# Patient Record
Sex: MALE | Race: WHITE | Employment: FULL TIME | URBAN - NONMETROPOLITAN AREA
[De-identification: names, ages, dates, MRNs, and addresses within clinical notes are randomized per-mention and may not be internally consistent; named-entity substitution may affect disease eponyms.]

---

## 2017-04-25 ENCOUNTER — OFFICE VISIT (OUTPATIENT)
Dept: INTERNAL MEDICINE | Age: 62
End: 2017-04-25

## 2017-04-25 VITALS
HEIGHT: 69 IN | WEIGHT: 172 LBS | DIASTOLIC BLOOD PRESSURE: 80 MMHG | SYSTOLIC BLOOD PRESSURE: 114 MMHG | BODY MASS INDEX: 25.48 KG/M2

## 2017-04-25 DIAGNOSIS — M79.10 MUSCLE PAIN: ICD-10-CM

## 2017-04-25 DIAGNOSIS — Z95.1 S/P CABG (CORONARY ARTERY BYPASS GRAFT): Primary | ICD-10-CM

## 2017-04-25 DIAGNOSIS — E55.9 VITAMIN D DEFICIENCY: ICD-10-CM

## 2017-04-25 PROCEDURE — G8598 ASA/ANTIPLAT THER USED: HCPCS | Performed by: INTERNAL MEDICINE

## 2017-04-25 PROCEDURE — G8419 CALC BMI OUT NRM PARAM NOF/U: HCPCS | Performed by: INTERNAL MEDICINE

## 2017-04-25 PROCEDURE — 1036F TOBACCO NON-USER: CPT | Performed by: INTERNAL MEDICINE

## 2017-04-25 PROCEDURE — 99213 OFFICE O/P EST LOW 20 MIN: CPT | Performed by: INTERNAL MEDICINE

## 2017-04-25 PROCEDURE — G8427 DOCREV CUR MEDS BY ELIG CLIN: HCPCS | Performed by: INTERNAL MEDICINE

## 2017-04-25 PROCEDURE — 3017F COLORECTAL CA SCREEN DOC REV: CPT | Performed by: INTERNAL MEDICINE

## 2017-04-25 PROCEDURE — 93000 ELECTROCARDIOGRAM COMPLETE: CPT | Performed by: INTERNAL MEDICINE

## 2017-04-25 RX ORDER — ATENOLOL 25 MG/1
12.5 TABLET ORAL DAILY
Qty: 30 TABLET | Refills: 11 | Status: SHIPPED | OUTPATIENT
Start: 2017-04-25 | End: 2018-04-23 | Stop reason: SDUPTHER

## 2017-04-25 RX ORDER — SIMVASTATIN 20 MG
20 TABLET ORAL NIGHTLY
Qty: 30 TABLET | Refills: 11 | Status: SHIPPED | OUTPATIENT
Start: 2017-04-25 | End: 2018-04-23 | Stop reason: SDUPTHER

## 2017-04-25 ASSESSMENT — PATIENT HEALTH QUESTIONNAIRE - PHQ9
2. FEELING DOWN, DEPRESSED OR HOPELESS: 0
SUM OF ALL RESPONSES TO PHQ9 QUESTIONS 1 & 2: 0
SUM OF ALL RESPONSES TO PHQ QUESTIONS 1-9: 0
1. LITTLE INTEREST OR PLEASURE IN DOING THINGS: 0

## 2017-06-28 ENCOUNTER — TELEPHONE (OUTPATIENT)
Dept: INTERNAL MEDICINE | Age: 62
End: 2017-06-28

## 2018-04-23 ENCOUNTER — OFFICE VISIT (OUTPATIENT)
Dept: INTERNAL MEDICINE CLINIC | Age: 63
End: 2018-04-23
Payer: COMMERCIAL

## 2018-04-23 VITALS
WEIGHT: 176 LBS | BODY MASS INDEX: 26.07 KG/M2 | HEIGHT: 69 IN | DIASTOLIC BLOOD PRESSURE: 70 MMHG | SYSTOLIC BLOOD PRESSURE: 126 MMHG

## 2018-04-23 DIAGNOSIS — E78.00 PURE HYPERCHOLESTEROLEMIA: ICD-10-CM

## 2018-04-23 DIAGNOSIS — Z95.1 S/P CABG (CORONARY ARTERY BYPASS GRAFT): Primary | ICD-10-CM

## 2018-04-23 PROCEDURE — 3017F COLORECTAL CA SCREEN DOC REV: CPT | Performed by: INTERNAL MEDICINE

## 2018-04-23 PROCEDURE — 1036F TOBACCO NON-USER: CPT | Performed by: INTERNAL MEDICINE

## 2018-04-23 PROCEDURE — 93000 ELECTROCARDIOGRAM COMPLETE: CPT | Performed by: INTERNAL MEDICINE

## 2018-04-23 PROCEDURE — G8427 DOCREV CUR MEDS BY ELIG CLIN: HCPCS | Performed by: INTERNAL MEDICINE

## 2018-04-23 PROCEDURE — G8417 CALC BMI ABV UP PARAM F/U: HCPCS | Performed by: INTERNAL MEDICINE

## 2018-04-23 PROCEDURE — G8598 ASA/ANTIPLAT THER USED: HCPCS | Performed by: INTERNAL MEDICINE

## 2018-04-23 PROCEDURE — 99213 OFFICE O/P EST LOW 20 MIN: CPT | Performed by: INTERNAL MEDICINE

## 2018-04-23 RX ORDER — ATENOLOL 25 MG/1
12.5 TABLET ORAL DAILY
Qty: 90 TABLET | Refills: 3 | Status: SHIPPED | OUTPATIENT
Start: 2018-04-23 | End: 2019-04-22 | Stop reason: SDUPTHER

## 2018-04-23 RX ORDER — CYCLOBENZAPRINE HCL 10 MG
TABLET ORAL PRN
COMMUNITY
Start: 2018-04-20

## 2018-04-23 RX ORDER — SIMVASTATIN 20 MG
20 TABLET ORAL NIGHTLY
Qty: 90 TABLET | Refills: 3 | Status: SHIPPED | OUTPATIENT
Start: 2018-04-23 | End: 2019-04-22

## 2018-10-29 RX ORDER — SIMVASTATIN 40 MG
40 TABLET ORAL EVERY EVENING
Qty: 90 TABLET | Refills: 3 | Status: SHIPPED | OUTPATIENT
Start: 2018-10-29 | End: 2019-12-02 | Stop reason: SDUPTHER

## 2019-04-22 ENCOUNTER — OFFICE VISIT (OUTPATIENT)
Dept: INTERNAL MEDICINE CLINIC | Age: 64
End: 2019-04-22
Payer: MEDICARE

## 2019-04-22 VITALS
SYSTOLIC BLOOD PRESSURE: 126 MMHG | HEIGHT: 69 IN | DIASTOLIC BLOOD PRESSURE: 74 MMHG | BODY MASS INDEX: 26.22 KG/M2 | WEIGHT: 177 LBS

## 2019-04-22 DIAGNOSIS — Z95.1 S/P CABG (CORONARY ARTERY BYPASS GRAFT): Primary | ICD-10-CM

## 2019-04-22 PROCEDURE — 1036F TOBACCO NON-USER: CPT | Performed by: INTERNAL MEDICINE

## 2019-04-22 PROCEDURE — 93000 ELECTROCARDIOGRAM COMPLETE: CPT | Performed by: INTERNAL MEDICINE

## 2019-04-22 PROCEDURE — 4040F PNEUMOC VAC/ADMIN/RCVD: CPT | Performed by: INTERNAL MEDICINE

## 2019-04-22 PROCEDURE — 99213 OFFICE O/P EST LOW 20 MIN: CPT | Performed by: INTERNAL MEDICINE

## 2019-04-22 PROCEDURE — 3017F COLORECTAL CA SCREEN DOC REV: CPT | Performed by: INTERNAL MEDICINE

## 2019-04-22 PROCEDURE — G8598 ASA/ANTIPLAT THER USED: HCPCS | Performed by: INTERNAL MEDICINE

## 2019-04-22 PROCEDURE — 1123F ACP DISCUSS/DSCN MKR DOCD: CPT | Performed by: INTERNAL MEDICINE

## 2019-04-22 PROCEDURE — G8427 DOCREV CUR MEDS BY ELIG CLIN: HCPCS | Performed by: INTERNAL MEDICINE

## 2019-04-22 PROCEDURE — G8417 CALC BMI ABV UP PARAM F/U: HCPCS | Performed by: INTERNAL MEDICINE

## 2019-04-22 RX ORDER — NITROGLYCERIN 0.4 MG/1
0.4 TABLET SUBLINGUAL PRN
Qty: 25 TABLET | Refills: 11 | Status: SHIPPED | OUTPATIENT
Start: 2019-04-22 | End: 2020-07-21 | Stop reason: SDUPTHER

## 2019-04-22 RX ORDER — ATENOLOL 25 MG/1
12.5 TABLET ORAL DAILY
Qty: 90 TABLET | Refills: 3 | Status: SHIPPED | OUTPATIENT
Start: 2019-04-22 | End: 2020-04-02 | Stop reason: SDUPTHER

## 2019-04-22 RX ORDER — EPINEPHRINE 0.3 MG/.3ML
0.3 INJECTION SUBCUTANEOUS PRN
COMMUNITY

## 2019-04-22 ASSESSMENT — PATIENT HEALTH QUESTIONNAIRE - PHQ9
SUM OF ALL RESPONSES TO PHQ QUESTIONS 1-9: 0
2. FEELING DOWN, DEPRESSED OR HOPELESS: 0
1. LITTLE INTEREST OR PLEASURE IN DOING THINGS: 0
SUM OF ALL RESPONSES TO PHQ QUESTIONS 1-9: 0
SUM OF ALL RESPONSES TO PHQ9 QUESTIONS 1 & 2: 0

## 2019-04-22 NOTE — PROGRESS NOTES
YOB: 1955    Chief Complaint   Patient presents with    Coronary Artery Disease     s/p cabg-1 year follow up     NEW SXS:    This patient presents for medical evaluation. I last saw the patient one yr  ago. This patient is followed regularly by Dr. Juan Alberto Henry    He reports no sxs of chest pain. Exercises lightly walking and bands. No gi sxs. Some urinary hesitancy. Gets prostate ck per Dr Juan Alberto Henry    Has multiple allergies; has had anaphylaxis. No severe arthritis. Patient Active Problem List   Diagnosis    Muscle pain    Tired    Sleep difficulties    Chest pain    Cold-induced injury    Costochondritis    Vitamin D deficiency    CAD (coronary artery disease)    S/P CABG (coronary artery bypass graft)       Current Outpatient Medications   Medication Sig Dispense Refill    EPINEPHrine (EPIPEN 2-DIMITRIS) 0.3 MG/0.3ML SOAJ injection Inject 0.3 mg into the muscle as needed Use as directed for allergic reaction      atenolol (TENORMIN) 25 MG tablet Take 0.5 tablets by mouth daily 90 tablet 3    nitroGLYCERIN (NITROSTAT) 0.4 MG SL tablet Place 1 tablet under the tongue as needed for Chest pain 25 tablet 11    simvastatin (ZOCOR) 40 MG tablet Take 1 tablet by mouth every evening New dose. 90 tablet 3    cyclobenzaprine (FLEXERIL) 10 MG tablet Take by mouth as needed       Diphenhydramine-APAP, sleep, (TYLENOL PM EXTRA STRENGTH PO) Take by mouth nightly as needed      aspirin 81 MG tablet Take 81 mg by mouth daily      VITAMIN D, CHOLECALCIFEROL, PO Take 4,000 Units by mouth once       Coenzyme Q10 (CO Q 10 PO) Take 400 mg by mouth every evening       loratadine-pseudoephedrine (CLARITIN-D 24-HOUR)  MG per tablet Take 1 tablet by mouth daily      diphenhydrAMINE (BENADRYL) 25 MG capsule Take 25 mg by mouth nightly as needed for Sleep       ALPRAZolam (XANAX) 0.5 MG tablet Take 1 tablet by mouth daily as needed.       Multiple Vitamins-Minerals (ONE-A-DAY MENS VITACRAVES) CHEW Take 1 tablet by mouth daily        No current facility-administered medications for this visit. Allergies   Allergen Reactions    Avelox [Moxifloxacin Hcl In Nacl] Anaphylaxis    Iodine Anaphylaxis    Iv [Iodides] Anaphylaxis    Biaxin [Clarithromycin] Other (See Comments)     Tongue & mouth blisters & peels    Amlodipine Other (See Comments)     Extreme fatigue    Lipitor [Atorvastatin] Other (See Comments)     achey    Other      MSG    Toprol Xl [Metoprolol] Other (See Comments)     Fatigue, slow heart rate    Zetia [Ezetimibe] Other (See Comments)     Muscle aches, felt like he had sand in eye, depression    Crestor [Rosuvastatin] Rash       I have reviewed the patient's medications, as well as, their past medical, social, and family history as appropriate. Review of Systems - General ROS: negative  Psychological ROS: negative  Hematological and Lymphatic ROS: No history of blood clots or bleeding disorder. Respiratory ROS: no cough, shortness of breath, or wheezing  Cardiovascular ROS: no chest pain or dyspnea on exertion  Gastrointestinal ROS: no abdominal pain, change in bowel habits, or black or bloody stools  Genito-Urinary ROS: some urinary slowng  Musculoskeletal ROS: negative  Neurological ROS: no TIA or stroke symptoms  Dermatological ROS: gets hives off and on    Blood pressure 126/74, height 5' 9.02\" (1.753 m), weight 177 lb (80.3 kg).     Physical Examination: General appearance - alert, well appearing, and in no distress  Mental status - alert, oriented to person, place, and time  Neck - supple, no significant adenopathy, no JVD, or carotid bruits  Chest - clear to auscultation, no wheezes, rales or rhonchi, symmetric air entry  Heart - normal rate, regular rhythm, normal S1, S2, no murmurs, rubs, clicks or gallops  Abdomen - soft, nontender, nondistended, no masses or organomegaly  Neurological - alert, oriented, normal speech, no focal findings or movement disorder noted  Musculoskeletal - no joint tenderness, deformity or swelling  Extremities - peripheral pulses normal, no pedal edema, no clubbing or cyanosis  Skin - normal coloration and turgor, no rashes, no suspicious skin lesions noted          Diagnosis Orders   1. S/P CABG (coronary artery bypass graft)  EKG 12 Lead    Lipid Panel       Orders Placed This Encounter   Procedures    Lipid Panel     Standing Status:   Future     Standing Expiration Date:   4/21/2020     Order Specific Question:   Is Patient Fasting?/# of Hours     Answer:   12    EKG 12 Lead     Order Specific Question:   Reason for Exam?     Answer: Other        IMP/PLAN:    1. Cad- stable, no sxs. cabg 2015  2. Hyperlipemia- ck lipids  3. Discussed colonoscopy        Will schedule follow up appointment in 1y. Continue medications at current doses. Signed By:  Igor Jiang M.D.

## 2019-05-25 LAB
CHOLESTEROL/HDL RATIO: 2.8 (ref 1–5)
CHOLESTEROL: 142 MG/DL (ref 150–200)
HDLC SERPL-MCNC: 51 MG/DL
LDL CHOLESTEROL CALCULATED: 75 MG/DL
LDL/HDL RATIO: 1.5
TRIGL SERPL-MCNC: 79 MG/DL (ref 27–150)
VLDLC SERPL CALC-MCNC: 16 MG/DL (ref 0–30)

## 2019-05-28 ENCOUNTER — TELEPHONE (OUTPATIENT)
Dept: INTERNAL MEDICINE CLINIC | Age: 64
End: 2019-05-28

## 2019-05-28 NOTE — TELEPHONE ENCOUNTER
----- Message from Sonia Griffith MD sent at 5/25/2019  2:51 PM EDT -----  Tell pt labs ok   May give values

## 2019-12-02 DIAGNOSIS — I25.119 CORONARY ARTERY DISEASE WITH ANGINA PECTORIS, UNSPECIFIED VESSEL OR LESION TYPE, UNSPECIFIED WHETHER NATIVE OR TRANSPLANTED HEART (HCC): Primary | ICD-10-CM

## 2019-12-02 RX ORDER — SIMVASTATIN 40 MG
40 TABLET ORAL EVERY EVENING
Qty: 90 TABLET | Refills: 3 | Status: SHIPPED | OUTPATIENT
Start: 2019-12-02 | End: 2021-02-22 | Stop reason: SDUPTHER

## 2020-02-21 ENCOUNTER — HOSPITAL ENCOUNTER (OUTPATIENT)
Dept: GENERAL RADIOLOGY | Age: 65
Discharge: HOME | End: 2020-02-21
Payer: MEDICARE

## 2020-02-21 ENCOUNTER — HOSPITAL ENCOUNTER (OUTPATIENT)
Age: 65
Discharge: HOME | End: 2020-02-21
Payer: MEDICARE

## 2020-02-21 PROCEDURE — 72072 X-RAY EXAM THORAC SPINE 3VWS: CPT

## 2020-04-02 RX ORDER — ATENOLOL 25 MG/1
12.5 TABLET ORAL DAILY
Qty: 90 TABLET | Refills: 3 | Status: SHIPPED | OUTPATIENT
Start: 2020-04-02 | End: 2021-07-21 | Stop reason: SDUPTHER

## 2020-07-21 ENCOUNTER — OFFICE VISIT (OUTPATIENT)
Dept: INTERNAL MEDICINE CLINIC | Age: 65
End: 2020-07-21
Payer: MEDICARE

## 2020-07-21 VITALS
HEIGHT: 69 IN | BODY MASS INDEX: 25.71 KG/M2 | TEMPERATURE: 97.2 F | SYSTOLIC BLOOD PRESSURE: 132 MMHG | WEIGHT: 173.6 LBS | DIASTOLIC BLOOD PRESSURE: 72 MMHG

## 2020-07-21 PROCEDURE — G8417 CALC BMI ABV UP PARAM F/U: HCPCS | Performed by: INTERNAL MEDICINE

## 2020-07-21 PROCEDURE — 4004F PT TOBACCO SCREEN RCVD TLK: CPT | Performed by: INTERNAL MEDICINE

## 2020-07-21 PROCEDURE — 1123F ACP DISCUSS/DSCN MKR DOCD: CPT | Performed by: INTERNAL MEDICINE

## 2020-07-21 PROCEDURE — 99213 OFFICE O/P EST LOW 20 MIN: CPT | Performed by: INTERNAL MEDICINE

## 2020-07-21 PROCEDURE — 3017F COLORECTAL CA SCREEN DOC REV: CPT | Performed by: INTERNAL MEDICINE

## 2020-07-21 PROCEDURE — 93000 ELECTROCARDIOGRAM COMPLETE: CPT | Performed by: INTERNAL MEDICINE

## 2020-07-21 PROCEDURE — G8427 DOCREV CUR MEDS BY ELIG CLIN: HCPCS | Performed by: INTERNAL MEDICINE

## 2020-07-21 PROCEDURE — 4040F PNEUMOC VAC/ADMIN/RCVD: CPT | Performed by: INTERNAL MEDICINE

## 2020-07-21 RX ORDER — NITROGLYCERIN 0.4 MG/1
0.4 TABLET SUBLINGUAL PRN
Qty: 25 TABLET | Refills: 11 | Status: SHIPPED | OUTPATIENT
Start: 2020-07-21 | End: 2021-07-21 | Stop reason: SDUPTHER

## 2020-07-21 ASSESSMENT — PATIENT HEALTH QUESTIONNAIRE - PHQ9
SUM OF ALL RESPONSES TO PHQ9 QUESTIONS 1 & 2: 0
SUM OF ALL RESPONSES TO PHQ QUESTIONS 1-9: 0
2. FEELING DOWN, DEPRESSED OR HOPELESS: 0
SUM OF ALL RESPONSES TO PHQ QUESTIONS 1-9: 0
1. LITTLE INTEREST OR PLEASURE IN DOING THINGS: 0

## 2020-07-21 NOTE — PROGRESS NOTES
mg by mouth nightly as needed for Sleep       ALPRAZolam (XANAX) 0.5 MG tablet Take 1 tablet by mouth daily as needed.  Multiple Vitamins-Minerals (ONE-A-DAY MENS VITACRAVES) CHEW Take 1 tablet by mouth daily        No current facility-administered medications for this visit. Allergies   Allergen Reactions    Avelox [Moxifloxacin Hcl In Nacl] Anaphylaxis    Iodine Anaphylaxis    Iv [Iodides] Anaphylaxis    Biaxin [Clarithromycin] Other (See Comments)     Tongue & mouth blisters & peels    Amlodipine Other (See Comments)     Extreme fatigue    Lipitor [Atorvastatin] Other (See Comments)     achey    Other      MSG    Toprol Xl [Metoprolol] Other (See Comments)     Fatigue, slow heart rate    Zetia [Ezetimibe] Other (See Comments)     Muscle aches, felt like he had sand in eye, depression    Crestor [Rosuvastatin] Rash       I have reviewed the patient's medications, as well as, their past medical, social, and family history as appropriate. Review of Systems - General ROS: negative  Psychological ROS: negative  Hematological and Lymphatic ROS: No history of blood clots or bleeding disorder. Respiratory ROS: no cough, shortness of breath, or wheezing  Cardiovascular ROS: no chest pain or dyspnea on exertion  Gastrointestinal ROS: no abdominal pain, change in bowel habits, or black or bloody stools  Genito-Urinary ROS: some urinary slowng  Musculoskeletal ROS: negative  Neurological ROS: no TIA or stroke symptoms  Dermatological ROS: gets hives off and on    Blood pressure 132/72, temperature 97.2 °F (36.2 °C), height 5' 9.02\" (1.753 m), weight 173 lb 9.6 oz (78.7 kg).     Physical Examination: General appearance - alert, well appearing, and in no distress  Mental status - alert, oriented to person, place, and time  Neck - supple, no significant adenopathy, no JVD, or carotid bruits  Chest - clear to auscultation, no wheezes, rales or rhonchi, symmetric air entry  Heart - normal rate, regular

## 2020-09-28 ENCOUNTER — TELEPHONE (OUTPATIENT)
Dept: INTERNAL MEDICINE CLINIC | Age: 65
End: 2020-09-28

## 2020-09-28 NOTE — TELEPHONE ENCOUNTER
RN called pt regarding fax received from [facility] lab regarding Cologuard screen that has not been returned. Pt's wife stated he decided not to do it at this time due to Covid.

## 2020-10-22 LAB
CHOLESTEROL/HDL RATIO: 3 (ref 1–5)
CHOLESTEROL: 199 MG/DL (ref 150–200)
HDLC SERPL-MCNC: 66 MG/DL
LDL CHOLESTEROL CALCULATED: 116 MG/DL
LDL/HDL RATIO: 1.8
TRIGL SERPL-MCNC: 83 MG/DL (ref 27–150)
VLDLC SERPL CALC-MCNC: 17 MG/DL (ref 0–30)

## 2020-10-23 ENCOUNTER — TELEPHONE (OUTPATIENT)
Dept: INTERNAL MEDICINE CLINIC | Age: 65
End: 2020-10-23

## 2020-10-23 NOTE — TELEPHONE ENCOUNTER
Send in rx for 20 mg as trial; tell him I suspect he will need 40 mg though; have him report back re tolerance

## 2020-10-23 NOTE — TELEPHONE ENCOUNTER
----- Message from Claudene Stabs, MD sent at 10/22/2020  6:04 PM EDT -----  His LDL is too high; would he try lipitor again? I know it says it made him achy.

## 2021-02-19 DIAGNOSIS — I25.119 CORONARY ARTERY DISEASE WITH ANGINA PECTORIS, UNSPECIFIED VESSEL OR LESION TYPE, UNSPECIFIED WHETHER NATIVE OR TRANSPLANTED HEART (HCC): ICD-10-CM

## 2021-02-19 NOTE — TELEPHONE ENCOUNTER
Franny Ortega called requesting a refill on the following medications:  Requested Prescriptions     Pending Prescriptions Disp Refills    simvastatin (ZOCOR) 40 MG tablet 90 tablet 3     Sig: Take 1 tablet by mouth every evening New dose. Pharmacy verified: [facility] in [facility]   . pv      Date of last visit: 7/21/2020  Date of next visit (if applicable): 7/48/5513

## 2021-02-22 RX ORDER — SIMVASTATIN 40 MG
40 TABLET ORAL EVERY EVENING
Qty: 90 TABLET | Refills: 3 | Status: SHIPPED | OUTPATIENT
Start: 2021-02-22 | End: 2022-02-23 | Stop reason: SDUPTHER

## 2021-07-21 ENCOUNTER — OFFICE VISIT (OUTPATIENT)
Dept: INTERNAL MEDICINE CLINIC | Age: 66
End: 2021-07-21
Payer: MEDICARE

## 2021-07-21 VITALS
SYSTOLIC BLOOD PRESSURE: 126 MMHG | DIASTOLIC BLOOD PRESSURE: 66 MMHG | WEIGHT: 169.6 LBS | HEIGHT: 69 IN | BODY MASS INDEX: 25.12 KG/M2 | TEMPERATURE: 97.7 F

## 2021-07-21 DIAGNOSIS — I25.119 CORONARY ARTERY DISEASE WITH ANGINA PECTORIS, UNSPECIFIED VESSEL OR LESION TYPE, UNSPECIFIED WHETHER NATIVE OR TRANSPLANTED HEART (HCC): ICD-10-CM

## 2021-07-21 DIAGNOSIS — E78.5 HYPERLIPIDEMIA, UNSPECIFIED HYPERLIPIDEMIA TYPE: ICD-10-CM

## 2021-07-21 DIAGNOSIS — Z95.1 S/P CABG (CORONARY ARTERY BYPASS GRAFT): Primary | ICD-10-CM

## 2021-07-21 DIAGNOSIS — E78.5 HYPERLIPIDEMIA, UNSPECIFIED HYPERLIPIDEMIA TYPE: Primary | ICD-10-CM

## 2021-07-21 PROCEDURE — 1123F ACP DISCUSS/DSCN MKR DOCD: CPT | Performed by: INTERNAL MEDICINE

## 2021-07-21 PROCEDURE — G8417 CALC BMI ABV UP PARAM F/U: HCPCS | Performed by: INTERNAL MEDICINE

## 2021-07-21 PROCEDURE — 99213 OFFICE O/P EST LOW 20 MIN: CPT | Performed by: INTERNAL MEDICINE

## 2021-07-21 PROCEDURE — 93000 ELECTROCARDIOGRAM COMPLETE: CPT | Performed by: INTERNAL MEDICINE

## 2021-07-21 PROCEDURE — G8427 DOCREV CUR MEDS BY ELIG CLIN: HCPCS | Performed by: INTERNAL MEDICINE

## 2021-07-21 PROCEDURE — 3017F COLORECTAL CA SCREEN DOC REV: CPT | Performed by: INTERNAL MEDICINE

## 2021-07-21 PROCEDURE — 4040F PNEUMOC VAC/ADMIN/RCVD: CPT | Performed by: INTERNAL MEDICINE

## 2021-07-21 PROCEDURE — 4004F PT TOBACCO SCREEN RCVD TLK: CPT | Performed by: INTERNAL MEDICINE

## 2021-07-21 RX ORDER — ATENOLOL 25 MG/1
12.5 TABLET ORAL DAILY
Qty: 90 TABLET | Refills: 3 | Status: SHIPPED | OUTPATIENT
Start: 2021-07-21 | End: 2022-08-25 | Stop reason: SDUPTHER

## 2021-07-21 RX ORDER — NITROGLYCERIN 0.4 MG/1
0.4 TABLET SUBLINGUAL PRN
Qty: 25 TABLET | Refills: 11 | Status: SHIPPED | OUTPATIENT
Start: 2021-07-21 | End: 2022-08-25 | Stop reason: SDUPTHER

## 2021-07-21 SDOH — ECONOMIC STABILITY: FOOD INSECURITY: WITHIN THE PAST 12 MONTHS, YOU WORRIED THAT YOUR FOOD WOULD RUN OUT BEFORE YOU GOT MONEY TO BUY MORE.: NEVER TRUE

## 2021-07-21 SDOH — ECONOMIC STABILITY: FOOD INSECURITY: WITHIN THE PAST 12 MONTHS, THE FOOD YOU BOUGHT JUST DIDN'T LAST AND YOU DIDN'T HAVE MONEY TO GET MORE.: NEVER TRUE

## 2021-07-21 ASSESSMENT — PATIENT HEALTH QUESTIONNAIRE - PHQ9
SUM OF ALL RESPONSES TO PHQ9 QUESTIONS 1 & 2: 0
2. FEELING DOWN, DEPRESSED OR HOPELESS: 0
1. LITTLE INTEREST OR PLEASURE IN DOING THINGS: 0
SUM OF ALL RESPONSES TO PHQ QUESTIONS 1-9: 0

## 2021-07-21 ASSESSMENT — SOCIAL DETERMINANTS OF HEALTH (SDOH): HOW HARD IS IT FOR YOU TO PAY FOR THE VERY BASICS LIKE FOOD, HOUSING, MEDICAL CARE, AND HEATING?: NOT HARD AT ALL

## 2021-07-21 NOTE — PROGRESS NOTES
Leticia Shields (:  1955) is a 74 y.o. male,Established patient, here for evaluation of the following chief complaint(s):  Coronary Artery Disease (s/p cabg-1 year), Hyperlipidemia, and Nail Problem (has toe nail fungus--tried  many things otc-wonders about a script)         ASSESSMENT/PLAN:  1. S/P CABG (coronary artery bypass graft)  -     EKG 12 Lead ekg  Normal.  No cp  2. Coronary artery disease with angina pectoris, unspecified vessel or lesion type, unspecified whether native or transplanted heart (HCC)  -     EKG 12 Lead  3. Hyperlipidemia, unspecified hyperlipidemia type  -     EKG 12 Lead  -     Lipid, Fasting; Future      Return in about 1 year (around 2022). Subjective   SUBJECTIVE/OBJECTIVE:  HPI pt with 3 vessel cabg, no recent sxs, seen in f/u. Has not had colonoscopy or covid shot. Advised both    Review of Systems    Twelve point ROS completed and found to be negative except as noted above. Objective   Physical Exam    General appearance - alert, well appearing, and in no distress    Mental Status - alert, oriented to person, place, and time          Neck - supple, no significant adenopathy        Chest - clear to auscultation, no wheezes, rales or rhonchi, symmetric air entry     Heart - normal rate, regular rhythm, normal S1, S2, no murmurs, rubs, clicks or gallops     Abdomen - soft, nontender, nondistended, no masses or organomegaly         Neurological - alert, oriented, normal speech, no focal findings or movement disorder noted     Musculoskeletal -   msk exam:[id number]}     Extremities - peripheral pulses normal, no pedal edema, no clubbing or cyanosis     Skin - normal coloration and turgor, no rashes, no suspicious skin lesions noted            An electronic signature was used to authenticate this note.     --Aria Turner MD

## 2021-08-07 LAB
CHOLESTEROL/HDL RATIO: 2.6 (ref 1–5)
CHOLESTEROL: 152 MG/DL (ref 150–200)
HDLC SERPL-MCNC: 58 MG/DL
LDL CHOLESTEROL CALCULATED: 79 MG/DL
LDL/HDL RATIO: 1.4
TRIGL SERPL-MCNC: 77 MG/DL (ref 27–150)
VLDLC SERPL CALC-MCNC: 15 MG/DL (ref 0–30)

## 2021-08-11 ENCOUNTER — TELEPHONE (OUTPATIENT)
Dept: INTERNAL MEDICINE CLINIC | Age: 66
End: 2021-08-11

## 2021-08-11 NOTE — TELEPHONE ENCOUNTER
I left a message for pt that labs are OK and to continue meds as is. If any questions, please contact the office.

## 2022-02-22 DIAGNOSIS — I25.119 CORONARY ARTERY DISEASE WITH ANGINA PECTORIS, UNSPECIFIED VESSEL OR LESION TYPE, UNSPECIFIED WHETHER NATIVE OR TRANSPLANTED HEART (HCC): ICD-10-CM

## 2022-02-23 RX ORDER — SIMVASTATIN 40 MG
40 TABLET ORAL EVERY EVENING
Qty: 90 TABLET | Refills: 3 | Status: SHIPPED | OUTPATIENT
Start: 2022-02-23

## 2022-08-25 ENCOUNTER — OFFICE VISIT (OUTPATIENT)
Dept: INTERNAL MEDICINE CLINIC | Age: 67
End: 2022-08-25
Payer: MEDICARE

## 2022-08-25 VITALS
DIASTOLIC BLOOD PRESSURE: 80 MMHG | HEIGHT: 69 IN | WEIGHT: 170.6 LBS | SYSTOLIC BLOOD PRESSURE: 126 MMHG | BODY MASS INDEX: 25.27 KG/M2 | TEMPERATURE: 97.4 F

## 2022-08-25 DIAGNOSIS — I25.119 CORONARY ARTERY DISEASE WITH ANGINA PECTORIS, UNSPECIFIED VESSEL OR LESION TYPE, UNSPECIFIED WHETHER NATIVE OR TRANSPLANTED HEART (HCC): Primary | ICD-10-CM

## 2022-08-25 DIAGNOSIS — T50.905S ADVERSE EFFECT OF DRUG, SEQUELA: ICD-10-CM

## 2022-08-25 DIAGNOSIS — Z95.1 S/P CABG (CORONARY ARTERY BYPASS GRAFT): ICD-10-CM

## 2022-08-25 DIAGNOSIS — E78.5 HYPERLIPIDEMIA, UNSPECIFIED HYPERLIPIDEMIA TYPE: ICD-10-CM

## 2022-08-25 PROCEDURE — 3017F COLORECTAL CA SCREEN DOC REV: CPT | Performed by: INTERNAL MEDICINE

## 2022-08-25 PROCEDURE — G8417 CALC BMI ABV UP PARAM F/U: HCPCS | Performed by: INTERNAL MEDICINE

## 2022-08-25 PROCEDURE — 93000 ELECTROCARDIOGRAM COMPLETE: CPT | Performed by: INTERNAL MEDICINE

## 2022-08-25 PROCEDURE — 4004F PT TOBACCO SCREEN RCVD TLK: CPT | Performed by: INTERNAL MEDICINE

## 2022-08-25 PROCEDURE — G8427 DOCREV CUR MEDS BY ELIG CLIN: HCPCS | Performed by: INTERNAL MEDICINE

## 2022-08-25 PROCEDURE — 1123F ACP DISCUSS/DSCN MKR DOCD: CPT | Performed by: INTERNAL MEDICINE

## 2022-08-25 PROCEDURE — 99213 OFFICE O/P EST LOW 20 MIN: CPT | Performed by: INTERNAL MEDICINE

## 2022-08-25 RX ORDER — HYDROCODONE BITARTRATE AND ACETAMINOPHEN 5; 325 MG/1; MG/1
TABLET ORAL
COMMUNITY

## 2022-08-25 RX ORDER — ATENOLOL 25 MG/1
12.5 TABLET ORAL DAILY
Qty: 90 TABLET | Refills: 3 | Status: SHIPPED | OUTPATIENT
Start: 2022-08-25

## 2022-08-25 RX ORDER — NITROGLYCERIN 0.4 MG/1
0.4 TABLET SUBLINGUAL PRN
Qty: 25 TABLET | Refills: 11 | Status: SHIPPED | OUTPATIENT
Start: 2022-08-25

## 2022-08-25 SDOH — ECONOMIC STABILITY: FOOD INSECURITY: WITHIN THE PAST 12 MONTHS, YOU WORRIED THAT YOUR FOOD WOULD RUN OUT BEFORE YOU GOT MONEY TO BUY MORE.: NEVER TRUE

## 2022-08-25 SDOH — ECONOMIC STABILITY: FOOD INSECURITY: WITHIN THE PAST 12 MONTHS, THE FOOD YOU BOUGHT JUST DIDN'T LAST AND YOU DIDN'T HAVE MONEY TO GET MORE.: NEVER TRUE

## 2022-08-25 ASSESSMENT — PATIENT HEALTH QUESTIONNAIRE - PHQ9
SUM OF ALL RESPONSES TO PHQ QUESTIONS 1-9: 0
1. LITTLE INTEREST OR PLEASURE IN DOING THINGS: 0

## 2022-08-25 ASSESSMENT — SOCIAL DETERMINANTS OF HEALTH (SDOH): HOW HARD IS IT FOR YOU TO PAY FOR THE VERY BASICS LIKE FOOD, HOUSING, MEDICAL CARE, AND HEATING?: NOT HARD AT ALL

## 2022-08-25 NOTE — PROGRESS NOTES
Ana María Larsen (:  1955) is a 69 y.o. male,Established patient, here for evaluation of the following chief complaint(s):  Coronary Artery Disease (S/p cabg) and Hyperlipidemia         ASSESSMENT/PLAN:  1. Coronary artery disease with angina pectoris, unspecified vessel or lesion type, unspecified whether native or transplanted heart (HCC)  -     EKG 12 Lead- reviewed, normal.  Denies chest pain; continue present meds  2. S/P CABG (coronary artery bypass graft)  -     EKG 12 Lead  See above    3. Hyperlipidemia, unspecified hyperlipidemia type  -     Lipid Panel; Future  4. Adverse effect of drug, sequela  -     Lipid Panel; Future      Return in about 1 year (around 2023). Subjective   SUBJECTIVE/OBJECTIVE:  HPIpt with cad, cabg , hyperlipemia, seen in f/u. Very active, no cp. Still working. Multiple allergies, vicky fall; itchy eyes, runny nose. Has not done colonoscopy, did not get covid shots. No gu sxs    No fh cad; stroke in grandparent    Review of Systems   Twelve point ROS completed and found to be negative except as noted above.       Objective   Physical Exam   /80 (Site: Left Upper Arm)   Temp 97.4 °F (36.3 °C)   Ht 5' 9.02\" (1.753 m)   Wt 170 lb 9.6 oz (77.4 kg)   BMI 25.18 kg/m²     General appearance - alert, well appearing, and in no distress    Mental Status - alert, oriented to person, place, and time          Neck - supple, no significant adenopathy        Chest - clear to auscultation, no wheezes, rales or rhonchi, symmetric air entry     Heart - normal rate, regular rhythm, normal S1, S2, no murmurs, rubs, clicks or gallops     Abdomen - soft, nontender, nondistended, no masses or organomegaly         Neurological - alert, oriented, normal speech, no focal findings or movement disorder noted       Extremities - peripheral pulses normal, no pedal edema, no clubbing or cyanosis     Skin - normal coloration and turgor, no rashes, no suspicious skin lesions noted            An electronic signature was used to authenticate this note.     --Abhishek Moore MD

## 2022-08-26 LAB
CHOLESTEROL/HDL RATIO: 2.7 RATIO
CHOLESTEROL: 160 MG/DL
HDLC SERPL-MCNC: 59 MG/DL
LDL CHOLESTEROL CALCULATED: 81 MG/DL
LDL/HDL RATIO: 1.4 RATIO
TRIGL SERPL-MCNC: 100 MG/DL
VLDLC SERPL CALC-MCNC: 20 MG/DL

## 2022-08-30 ENCOUNTER — TELEPHONE (OUTPATIENT)
Dept: INTERNAL MEDICINE CLINIC | Age: 67
End: 2022-08-30

## 2022-08-30 NOTE — TELEPHONE ENCOUNTER
----- Message from Hever Nobles MD sent at 8/27/2022 12:16 PM EDT -----  Tell pt labs ok   May give values    ----- Message -----  From: Floyd Keith In [place]  Sent: 8/26/2022  11:10 AM EDT  To: Hever Nobles MD

## 2023-03-29 DIAGNOSIS — I25.119 CORONARY ARTERY DISEASE WITH ANGINA PECTORIS, UNSPECIFIED VESSEL OR LESION TYPE, UNSPECIFIED WHETHER NATIVE OR TRANSPLANTED HEART (HCC): ICD-10-CM

## 2023-03-29 RX ORDER — SIMVASTATIN 40 MG
40 TABLET ORAL EVERY EVENING
Qty: 90 TABLET | Refills: 3 | Status: SHIPPED | OUTPATIENT
Start: 2023-03-29

## 2023-08-24 ENCOUNTER — OFFICE VISIT (OUTPATIENT)
Dept: INTERNAL MEDICINE CLINIC | Age: 68
End: 2023-08-24
Payer: MEDICARE

## 2023-08-24 VITALS
DIASTOLIC BLOOD PRESSURE: 80 MMHG | BODY MASS INDEX: 24.79 KG/M2 | HEART RATE: 57 BPM | HEIGHT: 69 IN | WEIGHT: 167.4 LBS | OXYGEN SATURATION: 96 % | SYSTOLIC BLOOD PRESSURE: 138 MMHG

## 2023-08-24 DIAGNOSIS — Z95.1 S/P CABG (CORONARY ARTERY BYPASS GRAFT): ICD-10-CM

## 2023-08-24 DIAGNOSIS — I25.119 CORONARY ARTERY DISEASE WITH ANGINA PECTORIS, UNSPECIFIED VESSEL OR LESION TYPE, UNSPECIFIED WHETHER NATIVE OR TRANSPLANTED HEART (HCC): Primary | ICD-10-CM

## 2023-08-24 DIAGNOSIS — N40.2 PROSTATE NODULE: ICD-10-CM

## 2023-08-24 DIAGNOSIS — E78.5 HYPERLIPIDEMIA, UNSPECIFIED HYPERLIPIDEMIA TYPE: ICD-10-CM

## 2023-08-24 PROCEDURE — 3075F SYST BP GE 130 - 139MM HG: CPT | Performed by: INTERNAL MEDICINE

## 2023-08-24 PROCEDURE — G8427 DOCREV CUR MEDS BY ELIG CLIN: HCPCS | Performed by: INTERNAL MEDICINE

## 2023-08-24 PROCEDURE — 93000 ELECTROCARDIOGRAM COMPLETE: CPT | Performed by: INTERNAL MEDICINE

## 2023-08-24 PROCEDURE — 3017F COLORECTAL CA SCREEN DOC REV: CPT | Performed by: INTERNAL MEDICINE

## 2023-08-24 PROCEDURE — 3079F DIAST BP 80-89 MM HG: CPT | Performed by: INTERNAL MEDICINE

## 2023-08-24 PROCEDURE — 99214 OFFICE O/P EST MOD 30 MIN: CPT | Performed by: INTERNAL MEDICINE

## 2023-08-24 PROCEDURE — 4004F PT TOBACCO SCREEN RCVD TLK: CPT | Performed by: INTERNAL MEDICINE

## 2023-08-24 PROCEDURE — G8420 CALC BMI NORM PARAMETERS: HCPCS | Performed by: INTERNAL MEDICINE

## 2023-08-24 PROCEDURE — 1123F ACP DISCUSS/DSCN MKR DOCD: CPT | Performed by: INTERNAL MEDICINE

## 2023-08-24 RX ORDER — ATENOLOL 25 MG/1
12.5 TABLET ORAL DAILY
Qty: 90 TABLET | Refills: 3 | Status: SHIPPED | OUTPATIENT
Start: 2023-08-24

## 2023-08-24 SDOH — ECONOMIC STABILITY: FOOD INSECURITY: WITHIN THE PAST 12 MONTHS, YOU WORRIED THAT YOUR FOOD WOULD RUN OUT BEFORE YOU GOT MONEY TO BUY MORE.: NEVER TRUE

## 2023-08-24 SDOH — ECONOMIC STABILITY: INCOME INSECURITY: HOW HARD IS IT FOR YOU TO PAY FOR THE VERY BASICS LIKE FOOD, HOUSING, MEDICAL CARE, AND HEATING?: NOT HARD AT ALL

## 2023-08-24 SDOH — ECONOMIC STABILITY: FOOD INSECURITY: WITHIN THE PAST 12 MONTHS, THE FOOD YOU BOUGHT JUST DIDN'T LAST AND YOU DIDN'T HAVE MONEY TO GET MORE.: NEVER TRUE

## 2023-08-24 SDOH — ECONOMIC STABILITY: HOUSING INSECURITY
IN THE LAST 12 MONTHS, WAS THERE A TIME WHEN YOU DID NOT HAVE A STEADY PLACE TO SLEEP OR SLEPT IN A SHELTER (INCLUDING NOW)?: NO

## 2023-08-24 ASSESSMENT — PATIENT HEALTH QUESTIONNAIRE - PHQ9
SUM OF ALL RESPONSES TO PHQ QUESTIONS 1-9: 0
SUM OF ALL RESPONSES TO PHQ9 QUESTIONS 1 & 2: 0
1. LITTLE INTEREST OR PLEASURE IN DOING THINGS: 0
2. FEELING DOWN, DEPRESSED OR HOPELESS: 0

## 2023-08-25 LAB
CHOLESTEROL/HDL RATIO: 2.6 RATIO
CHOLESTEROL: 160 MG/DL
HDLC SERPL-MCNC: 62 MG/DL
LDL CHOLESTEROL CALCULATED: 82 MG/DL
LDL/HDL RATIO: 1.3 RATIO
PSA, ULTRASENSITIVE: 2.73 NG/ML
TRIGL SERPL-MCNC: 79 MG/DL
VLDLC SERPL CALC-MCNC: 16 MG/DL

## 2023-08-28 ENCOUNTER — TELEPHONE (OUTPATIENT)
Dept: INTERNAL MEDICINE CLINIC | Age: 68
End: 2023-08-28

## 2023-08-28 NOTE — TELEPHONE ENCOUNTER
Left message per Hipaa that cholesterol labs were acceptable and PSA normal . Call the office if any questions .

## 2023-08-28 NOTE — TELEPHONE ENCOUNTER
----- Message from Niall Rader MD sent at 8/25/2023  9:17 AM EDT -----  Cholesterol not too bad; may give values.   Psa normal

## 2024-05-24 DIAGNOSIS — I25.119 CORONARY ARTERY DISEASE WITH ANGINA PECTORIS, UNSPECIFIED VESSEL OR LESION TYPE, UNSPECIFIED WHETHER NATIVE OR TRANSPLANTED HEART (HCC): ICD-10-CM

## 2024-05-24 RX ORDER — SIMVASTATIN 40 MG
40 TABLET ORAL EVERY EVENING
Qty: 90 TABLET | Refills: 3 | Status: SHIPPED | OUTPATIENT
Start: 2024-05-24

## 2024-09-30 ENCOUNTER — OFFICE VISIT (OUTPATIENT)
Dept: INTERNAL MEDICINE CLINIC | Age: 69
End: 2024-09-30

## 2024-09-30 VITALS
SYSTOLIC BLOOD PRESSURE: 116 MMHG | OXYGEN SATURATION: 98 % | HEART RATE: 53 BPM | HEIGHT: 69 IN | DIASTOLIC BLOOD PRESSURE: 78 MMHG | WEIGHT: 168.6 LBS | BODY MASS INDEX: 24.97 KG/M2

## 2024-09-30 DIAGNOSIS — I25.119 CORONARY ARTERY DISEASE WITH ANGINA PECTORIS, UNSPECIFIED VESSEL OR LESION TYPE, UNSPECIFIED WHETHER NATIVE OR TRANSPLANTED HEART (HCC): Primary | ICD-10-CM

## 2024-09-30 DIAGNOSIS — Z12.9 ENCOUNTER FOR SCREENING FOR MALIGNANT NEOPLASM: ICD-10-CM

## 2024-09-30 DIAGNOSIS — Z95.1 S/P CABG (CORONARY ARTERY BYPASS GRAFT): ICD-10-CM

## 2024-09-30 DIAGNOSIS — N40.2 PROSTATE NODULE: ICD-10-CM

## 2024-09-30 DIAGNOSIS — Z12.11 ENCOUNTER FOR SCREENING FOR MALIGNANT NEOPLASM OF COLON: ICD-10-CM

## 2024-09-30 DIAGNOSIS — Z12.5 ENCOUNTER FOR SCREENING FOR MALIGNANT NEOPLASM OF PROSTATE: ICD-10-CM

## 2024-09-30 DIAGNOSIS — I10 PRIMARY HYPERTENSION: ICD-10-CM

## 2024-09-30 DIAGNOSIS — Z12.9 CANCER SCREENING: ICD-10-CM

## 2024-09-30 DIAGNOSIS — E78.5 HYPERLIPIDEMIA, UNSPECIFIED HYPERLIPIDEMIA TYPE: ICD-10-CM

## 2024-09-30 RX ORDER — ATENOLOL 25 MG/1
6.25 TABLET ORAL DAILY
Qty: 45 TABLET | Refills: 3 | Status: CANCELLED | OUTPATIENT
Start: 2024-09-30

## 2024-09-30 RX ORDER — NITROGLYCERIN 0.4 MG/1
0.4 TABLET SUBLINGUAL PRN
Qty: 25 TABLET | Refills: 11 | Status: SHIPPED | OUTPATIENT
Start: 2024-09-30

## 2024-09-30 SDOH — ECONOMIC STABILITY: FOOD INSECURITY: WITHIN THE PAST 12 MONTHS, YOU WORRIED THAT YOUR FOOD WOULD RUN OUT BEFORE YOU GOT MONEY TO BUY MORE.: NEVER TRUE

## 2024-09-30 SDOH — ECONOMIC STABILITY: INCOME INSECURITY: HOW HARD IS IT FOR YOU TO PAY FOR THE VERY BASICS LIKE FOOD, HOUSING, MEDICAL CARE, AND HEATING?: NOT HARD AT ALL

## 2024-09-30 SDOH — ECONOMIC STABILITY: FOOD INSECURITY: WITHIN THE PAST 12 MONTHS, THE FOOD YOU BOUGHT JUST DIDN'T LAST AND YOU DIDN'T HAVE MONEY TO GET MORE.: NEVER TRUE

## 2024-09-30 ASSESSMENT — PATIENT HEALTH QUESTIONNAIRE - PHQ9
SUM OF ALL RESPONSES TO PHQ9 QUESTIONS 1 & 2: 0
SUM OF ALL RESPONSES TO PHQ QUESTIONS 1-9: 0
1. LITTLE INTEREST OR PLEASURE IN DOING THINGS: NOT AT ALL
2. FEELING DOWN, DEPRESSED OR HOPELESS: NOT AT ALL
SUM OF ALL RESPONSES TO PHQ QUESTIONS 1-9: 0

## 2024-09-30 NOTE — PROGRESS NOTES
Pantera Sapp (:  1955) is a 69 y.o. male,Established patient, here for evaluation of the following chief complaint(s):  Coronary Artery Disease (S/p CABG), Hyperlipidemia, and prostate nodule         Assessment & Plan  Coronary artery disease with angina pectoris, unspecified vessel or lesion type, unspecified whether native or transplanted heart (HCC)   No sxs; exercises; stop atenolol    Orders:    EKG 12 Lead    S/P CABG (coronary artery bypass graft)  See above      Orders:    EKG 12 Lead    Hyperlipidemia, unspecified hyperlipidemia type    Discussed would like LDL lower; explore repatha coverage    Orders:    Lipid Panel; Future    Cancer screening    PSA         Encounter for screening for malignant neoplasm    COLOGUARD    Orders:    Fecal DNA Colorectal cancer screening (Cologuard)    PSA Screening; Future    Encounter for screening for malignant neoplasm of colon    .ABOV    Orders:    Fecal DNA Colorectal cancer screening (Cologuard)    Encounter for screening for malignant neoplasm of prostate   See above      Orders:    PSA Screening; Future    Primary hypertension    Controlled; drop atenolol         Prostate nodule    Examined; tiny soft; PSA           Return in about 1 year (around 2025).       Subjective   HPI Pt with CAD, prior cabg ; seen in f/u.  No sxs, gets regular exercise.      Review of Systems   No gu sxs.  Occas heartburn.  Minor arthritic sxs.    Objective   Physical Exam   /78 (Site: Left Upper Arm)   Pulse 53   Ht 1.753 m (5' 9.02\")   Wt 76.5 kg (168 lb 9.6 oz)   SpO2 98% Comment: at rest on room air  BMI 24.89 kg/m²   General appearance - alert, well appearing, and in no distress    Mental Status - alert, oriented to person, place, and time          Neck - supple, no significant adenopathy        Chest - clear to auscultation, no wheezes, rales or rhonchi, symmetric air entry     Heart - normal rate, regular rhythm, normal S1, S2, no murmurs, rubs,

## 2024-09-30 NOTE — PATIENT INSTRUCTIONS
When calling in to speak to office press opt 2 then 3 then 1 and that gets you to  and ask for Nickolas if you need to speak with me.

## 2024-10-01 ENCOUNTER — TELEPHONE (OUTPATIENT)
Dept: INTERNAL MEDICINE CLINIC | Age: 69
End: 2024-10-01

## 2024-10-01 LAB
CHOLESTEROL, TOTAL: 170 MG/DL (ref 100–199)
CHOLESTEROL/HDL RATIO: 2.6 (ref 2–4.5)
HDLC SERPL-MCNC: 65 MG/DL
LDL CHOLESTEROL: 82 MG/DL
LDL/HDL RATIO: 1.3
PSA, ULTRASENSITIVE: 3.04 NG/ML (ref 0–4)
TRIGL SERPL-MCNC: 117 MG/DL (ref 20–149)
VLDLC SERPL CALC-MCNC: 23 MG/DL

## 2024-10-01 NOTE — TELEPHONE ENCOUNTER
----- Message from Dr. Ayo Stokes MD sent at 10/1/2024 11:24 AM EDT -----  Can review labs; they are stable

## 2024-10-02 ENCOUNTER — TELEPHONE (OUTPATIENT)
Dept: INTERNAL MEDICINE CLINIC | Age: 69
End: 2024-10-02

## 2024-10-02 NOTE — TELEPHONE ENCOUNTER
----- Message from Sonia RUIZ sent at 10/2/2024 11:34 AM EDT -----  Regarding: RE: re:  repatha  Good morning Nickolas!  This medication is not on the patients insurance formulary, and they will not cover it. You could attempt a PA, but no guarantee that it will be covered since its non-formulary.    Thank you,  Sonia Lee, [facility] - Prescription Assistance ([phone]) 10/2/2024,11:35 AM  ----- Message -----  From: Nickolas Zamorano MA  Sent: 9/30/2024  11:30 AM EDT  To: Sonia Lee  Subject: re:  repatha                                     Good Morning Sonia!  Can you check copay for Pantera for Repatha.   We greatly appreciate you.  Thank you.  GERALDINE Olmos

## 2024-10-02 NOTE — TELEPHONE ENCOUNTER
Pts wife was notified that Repatha not covered and Dr. Stokes will just forget it in that case.  She verbalizes understanding and is in agreement with that.

## 2025-06-17 DIAGNOSIS — I25.119 CORONARY ARTERY DISEASE WITH ANGINA PECTORIS, UNSPECIFIED VESSEL OR LESION TYPE, UNSPECIFIED WHETHER NATIVE OR TRANSPLANTED HEART: ICD-10-CM

## 2025-06-18 RX ORDER — SIMVASTATIN 40 MG
40 TABLET ORAL EVERY EVENING
Qty: 90 TABLET | Refills: 3 | Status: SHIPPED | OUTPATIENT
Start: 2025-06-18